# Patient Record
Sex: FEMALE | HISPANIC OR LATINO | URBAN - METROPOLITAN AREA
[De-identification: names, ages, dates, MRNs, and addresses within clinical notes are randomized per-mention and may not be internally consistent; named-entity substitution may affect disease eponyms.]

---

## 2022-09-15 ENCOUNTER — APPOINTMENT (RX ONLY)
Dept: URBAN - METROPOLITAN AREA CLINIC 12 | Facility: CLINIC | Age: 37
Setting detail: DERMATOLOGY
End: 2022-09-15

## 2022-09-15 DIAGNOSIS — Z41.9 ENCOUNTER FOR PROCEDURE FOR PURPOSES OTHER THAN REMEDYING HEALTH STATE, UNSPECIFIED: ICD-10-CM

## 2022-09-15 PROCEDURE — ? CONSULTATION - FILLERS

## 2022-09-15 PROCEDURE — ? BOTOX

## 2022-09-15 NOTE — PROCEDURE: CONSULTATION - FILLERS
Additional Area 3 Location: perioral lines
Additional Area 1 Secondary Filler Volume In Cc (Estimated): 0
Additional Area 2 Location: glabella
Lips Filler (Primary): Alexandro Ultra
Marionette Lines Primary Filler Volume In Cc (Estimated): 1
Upper Lips Primary Filler Volume In Cc (Estimated): 0.5
Additional Area 1 Location: chin
Bhavya Lines Filler (Primary): Juvederm Ultra Plus
Additional Area 5 Location: dorsal hands
Send Procedure Quote As Charge: No
Detail Level: Detailed
Additional Area 4 Location: oral commissures

## 2022-09-15 NOTE — PROCEDURE: BOTOX
Dilution (U/0.1 Cc): 5
Nasal Root Units: 0
Periorbital Skin Units: 10
Show Price In Note?: yes
Additional Area 2 Location: platysma
Consent: Written consent was obtained prior to the procedure. Risks, benefits, expectations and alternatives were discussed including, but not limited to, infection, bleeding, lid/brow ptosis, bruising, swelling, diplopia, temporary effects, incomplete chemical denervation and dissatisfaction with the cosmetic outcome. No guarantee or warranty was given or implied regarding longevity of results.
Postcare Instructions: Patient instructed to not lie down for 4 hours and limit physical activity for 24 hours. Patient instructed not to travel by airplane for 48 hours.
Detail Level: Simple
Use Map Statement For Sites (Optional): No
Additional Area 3 Location: gummy smile
Bill Summary Price Listed Below, Or Bill Total Of Units X Price Per Unit?: Bill #Units x Price Per Unit
Glabellar Complex Units: 20
Map Statment: See attached map for complete details
Additional Area 1 Location: chin
Administered By (Optional): ANA Sands